# Patient Record
(demographics unavailable — no encounter records)

---

## 2025-06-24 NOTE — ASSESSMENT
[FreeTextEntry1] :   - Healed Right Clavicle Fracture: Based on the patient's reported absence of symptoms, full range of motion on physical examination, and radiographic evidence of proper healing, I assess that the right clavicle fracture has healed successfully. The patient has demonstrated excellent recovery, even completing a marathon without clavicle-related issues.     - No further restrictions on activities     - Patient education provided on the successful healing of the fracture     - Advised to return for follow-up only if new issues or concerns arise     - No scheduled follow-up appointment necessary at this time     - No further routine x-rays required unless new symptoms develop     - Instructed patient on proper care and activities to maintain bone health     - Encouraged to continue with normal activities and exercise as tolerated

## 2025-06-24 NOTE — PHYSICAL EXAM
[de-identified] :   - On examination, the patient demonstrates full range of motion in the affected area with no limitations. The surgical site appears well-healed with no signs of infection, inflammation, or abnormal swelling. Palpation of the clavicle region reveals no tenderness or abnormalities. Neurovascular status of the upper extremity is intact. The patient's overall appearance is healthy, and he is in no apparent distress. [de-identified] :   - Right Clavicle X-rays obtained during this visit show that the fracture has healed satisfactorily. The images reveal good alignment and callus formation at the fracture site. The 'butterfly piece' mentioned in previous assessments is still slightly visible on the x-ray but is well-incorporated into the healing bone. All surgical hardware (screws) appear to be in proper position with no signs of loosening or migration. There are no signs of non-union, malunion, or other complications visible on the radiographs.

## 2025-06-24 NOTE — HISTORY OF PRESENT ILLNESS
[de-identified] : History of Present Illness:   - Mr. Garcia underwent surgery for open reduction and internal fixation of right clavicle fracture on February 26, 2025. Initially, he experienced significant discomfort which he self-managed at home. After the first week, he reports symptoms began improving. He states that for the first two weeks, the area around his clavicle was numb, but sensation is gradually returning. Currently, Mr. Garcia is engaging in light gym workouts and jog to maintain fitness. He plans to run the [place], which is expected to take place on April 27th.  Mr. Garcia reports that pain is absent, though there is a bothersome sensation of the metal fixator impacting the wound while jogging or working out.  4/28 - Summary : Patient is a male, status post-op reduction and fixation of right clavicle fracture, presenting for routine follow-up approximately two months after surgery. He reports feeling good with no significant issues. - Chief Complaint (CC) : Routine follow-up for right clavicle fracture fixation - History of Present Illness (HPI) : Patient underwent right clavicle fracture fixation on February 26, 2025. He is now about two months post-op. Patient reports feeling good overall, with no significant pain or issues. He states he can feel the hardware but doesn't report pain. Patient mentions he's not yet back to his pre-injury bench press weight of 205 pounds. He recently completed a half marathon without shoulder discomfort and is planning for a full marathon at the end of the month.  6/16   - David Garcia, a 19-year-old male, presents for a routine follow-up appointment for a right clavicle fracture. The patient is now over three months post-surgical repair, which was performed in February. He reports no current concerns or issues related to the fracture site. The patient mentions having recently completed a marathon on June 8th, experiencing no discomfort in the clavicle area during the event. He only noted typical leg fatigue associated with the marathon distance. The patient has been compliant with post-operative care and has not reported any complications or setbacks in his recovery process.